# Patient Record
Sex: FEMALE | Race: ASIAN
[De-identification: names, ages, dates, MRNs, and addresses within clinical notes are randomized per-mention and may not be internally consistent; named-entity substitution may affect disease eponyms.]

---

## 2017-01-11 NOTE — DX
DEXA Bone Mineral Densitometry followup   

 

Clinical Indications:  Followup osteoporosis (M 81.0). On the questionnaire, the patient reports as s
he takes supplemental vitamin D.

 

Comparison: December 3, 2014

 

Technique:  Bone Mineral Densitometry (BMD) by Dual Energy X-Ray Absorptiometry (DEXA) was performed 
utilizing the [employer] scanner.  The lumbar spine was evaluated in the AP projection.  The bilat
eral hips and left forearm were evaluated in the AP projection. Vertebral fracture assessment was als
o performed.

 

AP Lumbar Spine:  The L1, L2, L3 and L4 vertebral bodies are evaluated.  

BMD:  1.173 gm/cm2 

T-score:  -0.2 SD 

Z-score: 1.8 SD

Since the previous study, there has been mild increase in bone mineral density by 5.8%.

 

AP Left Hip: 

BMD:  0.798 gm/cm2

T-score:  -1.7 SD 

Z-score:  0.0 SD

Since the previous study, there has been no significant change.

 

AP Right Hip: 

BMD:  0.813 gm/cm2

T-score:  -1.5 SD 

Z-score:  0.1 SD

Since the previous study, there has been no significant change.

 

AP Left Forearm:  

BMD:  0.550 gm/cm2

T-score:  -3.7 SD

Z-score:  -2.0 SD 

Since the previous study, there has been slight decrease in bone mineral density by 6.1%.

 

Vertebral Fracture Assessment:  No significant fracture deformity.

 

The ten year risk for any major osteoporotic fracture is 6.9% and for a hip fracture is 1.7%.

 

Conclusion: Considering the lowest measured site, the patient is remains osteoporotic with the greate
st degree in the forearm.  Any bone loss in this patient is probably related to aging or estrogen def
iciency.  Preferential bone loss in the forearm does raise the possibility of underlying hyperparathy
roidism. There has been a slight increase in bone mineral density the lumbar spine since the prior st
udy, however.

 

Recommendations: To limit progression of osteoporosis and to promote bone density, consider the follo
win. If not artery performed, consider checking patient's PTH and serum calcium and phosphorus levels f
or possible hyperparathyroidism.

2. Pursue a regular regimen of weightbearing and muscle-strengthening exercises in order to reduce th
e risk of falls and fracture (as tolerated by the patient's general medical condition).

3. Ensure that total daily calcium intake is at least 1500 mg (DIET more important than supplements).


4. Check serum hydroxy vitamin D3 (normal >30ng/ml).

5. Ensure daily intake of vitamin D at least 800 international units.

6. Consider follow up DEXA scan in two years to assess the rate of bone loss in this patient.

7. If secondary causes are excluded, then consider initiating treatment with a bisphosphonate (such a
s Fosamax, Actonel or Boniva). If the patient is unable to use an oral bisphosphonate, another agent 
such as IV bisphosphonates (Boniva or Reclast), teriparatide (Forteo), or a selective estrogen recept
or modulator (Evista) might be considered.

## 2017-01-16 NOTE — US
Transabdominal and Transvaginal Pelvic Ultrasound

 

History: 71-year-old with right adnexal cyst.

 

Comparison:  Pelvic ultrasound of December 9, 2015 and November 18, 2014.

 

Findings: 

 

Transabdominal: The uterus measures 5.0 x 4.4 x 2.6 cm.  The bladder is normal. 

 

Transvaginal: The endometrium is homogeneous and measures 4 mm. The left ovary measures 2.0 x 0.9 x 1
.6 cm. The right ovary measures 1.0 x 1.5 x 1.1 cm.  A simple right adnexal cyst measures 3.1 x 2.5 x
 2.5 cm, minimally increased since the previous study, where it measured 2.6 x 2.2 x 2.3 cm. Although
 Doppler visualization is limited, there appears to normal arterial blood flow to both ovaries. There
 is no free fluid.

 

Impression: Minimal increase in size of a simple right adnexal cyst, currently measuring up to 3.1 cm
, previously 2.6 cm. For a simple cyst of this size, annual follow up is recommended.

## 2017-05-17 ENCOUNTER — HOSPITAL ENCOUNTER (OUTPATIENT)
Dept: HOSPITAL 80 - FIMAGING | Age: 72
End: 2017-05-17
Attending: OBSTETRICS & GYNECOLOGY
Payer: COMMERCIAL

## 2017-05-17 DIAGNOSIS — N83.201: Primary | ICD-10-CM

## 2017-11-14 ENCOUNTER — HOSPITAL ENCOUNTER (OUTPATIENT)
Dept: HOSPITAL 80 - CIMAGING | Age: 72
End: 2017-11-14
Attending: OBSTETRICS & GYNECOLOGY
Payer: COMMERCIAL

## 2017-11-14 DIAGNOSIS — N83.201: Primary | ICD-10-CM

## 2017-12-05 ENCOUNTER — HOSPITAL ENCOUNTER (OUTPATIENT)
Dept: HOSPITAL 80 - CIMAGING | Age: 72
End: 2017-12-05
Attending: INTERNAL MEDICINE
Payer: COMMERCIAL

## 2017-12-05 DIAGNOSIS — Z12.31: Primary | ICD-10-CM

## 2017-12-05 PROCEDURE — G0202 SCR MAMMO BI INCL CAD: HCPCS

## 2018-12-06 ENCOUNTER — HOSPITAL ENCOUNTER (OUTPATIENT)
Dept: HOSPITAL 80 - CIMAGING | Age: 73
End: 2018-12-06
Attending: GENERAL ACUTE CARE HOSPITAL
Payer: COMMERCIAL

## 2018-12-06 DIAGNOSIS — Z12.31: Primary | ICD-10-CM
